# Patient Record
Sex: FEMALE | Race: OTHER | Employment: PART TIME | URBAN - METROPOLITAN AREA
[De-identification: names, ages, dates, MRNs, and addresses within clinical notes are randomized per-mention and may not be internally consistent; named-entity substitution may affect disease eponyms.]

---

## 2018-05-24 ENCOUNTER — OFFICE VISIT (OUTPATIENT)
Dept: FAMILY MEDICINE CLINIC | Age: 38
End: 2018-05-24

## 2018-05-24 VITALS
SYSTOLIC BLOOD PRESSURE: 113 MMHG | HEIGHT: 60 IN | TEMPERATURE: 98.2 F | BODY MASS INDEX: 27.45 KG/M2 | DIASTOLIC BLOOD PRESSURE: 72 MMHG | WEIGHT: 139.8 LBS | HEART RATE: 76 BPM

## 2018-05-24 DIAGNOSIS — K11.5 SIALOLITHIASIS OF SUBMANDIBULAR GLAND: Primary | ICD-10-CM

## 2018-05-24 RX ORDER — CEPHALEXIN 500 MG/1
500 CAPSULE ORAL 4 TIMES DAILY
Qty: 40 CAP | Refills: 0 | Status: SHIPPED | OUTPATIENT
Start: 2018-05-24 | End: 2018-06-03

## 2018-05-24 NOTE — PROGRESS NOTES
The pt left the clinic but was called to come back to the clinic for her AVS and coupon for her medication. Told pt that rx's have been sent to pharmacy and they should be ready for  in approximately 2 hrs.reviewed th emedication ordered today with the pt. Pt told to please present GoodRx. com coupon which we provided to your pharmacy to receive discounted craig. Joseph Sifuentes was the .  Sudhakar Garcia RN

## 2018-05-24 NOTE — PROGRESS NOTES
Subjective:     Chief Complaint   Patient presents with    Mouth Pain     x 3 days, denies fever        She  is a 41 y.o. female who presents for evaluation of mouth pain. Onset approx Tue as mild and yesterday pain worsened. Noted pain is under her tongue. Occasionally bleeding noting during toothbrushing, but none during the day. No assoc nodules nor vesicles nor exudate noted. Last DDS work approx 6 months ago, had a molar removed at [facility]. Noted some chills/fevers last night. No assoc dysphagia nor throat pain. ROS  Gen - no fever/chills  Resp - no dyspnea or cough  CV - no chest pain or ARZATE  Rest per HPI    History reviewed. No pertinent past medical history. History reviewed. No pertinent surgical history. Current Outpatient Prescriptions on File Prior to Visit   Medication Sig Dispense Refill    metroNIDAZOLE (FLAGYL) 500 mg tablet Take one pill twice daily for one week. No ETOH. Yakut please 14 Tab 0     No current facility-administered medications on file prior to visit. Objective:     Vitals:    05/24/18 1124   BP: 113/72   Pulse: 76   Temp: 98.2 °F (36.8 °C)   TempSrc: Oral   Weight: 139 lb 12.8 oz (63.4 kg)   Height: 5' 0.04\" (1.525 m)       Physical Examination:  General appearance - alert, well appearing, and in no distress  Eyes -sclera anicteric  Neck - supple, no significant adenopathy, no thyromegaly  Chest - clear to auscultation, no wheezes, rales or rhonchi, symmetric air entry  Heart - normal rate, regular rhythm, normal S1, S2, no murmurs, rubs, clicks or gallops  Neurological - alert, oriented, no focal findings or movement disorder noted    Noted, mod inflamm of sub-mandibular gland, extreme tenderness, no visible exudate noted  No regional lymphadenopathy     Assessment/ Plan:   Diagnoses and all orders for this visit:    1. Sialolithiasis of submandibular gland  -     cephALEXin (KEFLEX) 500 mg capsule;  Take 1 Cap by mouth four (4) times daily for 10 days. Suspect infect salv gland given Hx/exam.     Start PO Keflex QID x 10 days. Pt has Diclofenac at home, take 2-3x day PRN for pain relief. Discussed emergency S&S r/t breathing/swallowing or extreme swelling that would warrant urgent ED Eval/     Work note given for return Tue 5/29. Re-eval PRN if no improvement in 2-3 weeks. I have discussed the diagnosis with the patient and the intended plan as seen in the above orders. The patient has received an after-visit summary and questions were answered concerning future plans. I have discussed medication side effects and warnings with the patient as well. The patient verbalizes understanding and agreement with the plan.     Follow-up Disposition: Not on File

## 2018-05-24 NOTE — PROGRESS NOTES
Chief Complaint   Patient presents with    Mouth Pain     x 3 days, denies fever     Coordination of Care  1. Have you been to the ER, urgent care clinic since your last visit? Hospitalized since your last visit? No    2. Have you seen or consulted any other health care providers outside of the [address] [place] [address] since your last visit? Include any pap smears or colon screening. No    Does the patient need refills? NO    Learning Assessment Complete?  yes

## 2018-05-24 NOTE — PATIENT INSTRUCTIONS
Cálculo de la glándula salival: Instrucciones de cuidado - [ Salivary Gland Stone: Care Instructions ]  Instrucciones de cuidado    Las glándulas salivales producen saliva. Un cálculo de la glándula salival es miriam acumulación (o concreción) de material leo, generalmente calcio, que puede formarse en cualquiera de las lindy glándulas salivales principales en la boca. Los cálculos de la glándula salival también se llaman cálculos del conducto salival. Los cálculos se roel la mayoría de las veces en la glándula que libera saliva debajo de la lengua. Un cálculo puede impedir que la saliva fluya de la glándula. Cuando la saliva se acumula detrás del cálculo puede hacer que la glándula se hinche. La glándula se hincha cuando usted come, y Jaime la hinchazón disminuye lentamente después de comer. Rosezella Epley y el dolor pueden estar debajo de la mandíbula o en la mona adelante del oído, según la glándula que esté afectada. Degroot médico le preguntará acerca de sean síntomas. Él o alexandra betzaida vez pueda guilherme y sentir la glándula debajo de la piel o en el suelo de la boca. Quizás le jarad pruebas por imágenes, baljit miriam tomografía computarizada (CT, por sean siglas en inglés) o miriam ecografía. [place] ayudará a degroot médico a saber si usted tiene un cálculo en vez de algún otro problema. La mayoría de los cálculos salen en la boca por sí solos. Mientras el cálculo está en la glándula, degroot médico puede hacer que tome analgésicos (medicamentos para el dolor). También hay algunas cosas que puede hacer en el hogar para ayudar a que se desplace el cálculo. Si el cálculo en la glándula no sale al cabo de [employer], degroot médico hablará con usted acerca de las opciones de [place]. La atención de seguimiento es miriam parte clave de degroot tratamiento y seguridad. Asegúrese de hacer y acudir a todas las citas, y llame a degroot médico si está teniendo problemas.  También es miriam buena idea saber los resultados de los exámenes y mantener miriam lista de [place] medicamentos que lucía. ¿Cómo puede cuidarse en el hogar? · Sea renée con los medicamentos. [place] y siga todas las instrucciones de la [place]. ¨ Si el médico le recetó un analgésico, tómelo baljit se lo indicó. ¨ Si no está tomando un analgésico recetado, pregúntele a degroot médico si puede tequila un medicamento de [facility]. · Si degroot médico le recetó antibióticos, tómelos según las indicaciones. No deje de tomarlos solo porque se sienta mejor. Usted debe tequila todos los antibióticos [employer]. · Use goma de mascar o caramelos sin azúcar baljit pastillas de henrry, o chupe un gajo de henrry. Aumentan la producción de saliva, lo cual puede ayudar a empujar el cálculo hacia afuera. · [place] suavemente la glándula afectada para ayudar a que salga el cálculo. ¿Cuándo debe pedir ayuda? Llame a degroot médico ahora mismo o busque atención médica inmediata si:  ? · Tiene síntomas de infección, tales baljit:  ¨ Aumento de dolor, hinchazón, temperatura o enrojecimiento. ¨ Vetas rojizas que salen de la glándula salival.  ¨ Pus que sale de la mona de donde sale la saliva en la boca. [place]. ?Preste especial atención a los cambios en degroot tasia y asegúrese de comunicarse con degroot médico si:  ? · No mejora baljit se esperaba. ¿Dónde puede encontrar más información en inglés? [place] a http://radha-taj.info/. Gaby Shepherd Y417 en la búsqueda para aprender más acerca de \"Cálculo de la glándula salival: Instrucciones de cuidado - [ Salivary Gland Stone: Care Instructions ]. \"  Revisado: 12 craig, 2017  Versión del contenido: 11.4  © 2229-0068 Healthwise, [facility]. Las instrucciones de cuidado fueron adaptadas bajo licencia por Good Help Connections (which disclaims liability or warranty for this information). Si usted tiene [place] afección médica o sobre estas instrucciones, siempre pregunte a degroot profesional de tasia.  Contact At Once!, [facility] niega toda garantía o responsabilidad por degroot uso de esta información.

## 2018-05-25 ENCOUNTER — HOSPITAL ENCOUNTER (EMERGENCY)
Age: 38
Discharge: HOME | End: 2018-05-25
Attending: EMERGENCY MEDICINE
Payer: SELF-PAY

## 2018-05-25 VITALS
HEART RATE: 103 BPM | BODY MASS INDEX: 25.56 KG/M2 | SYSTOLIC BLOOD PRESSURE: 121 MMHG | TEMPERATURE: 99.7 F | WEIGHT: 138.89 LBS | RESPIRATION RATE: 18 BRPM | OXYGEN SATURATION: 100 % | DIASTOLIC BLOOD PRESSURE: 80 MMHG | HEIGHT: 62 IN

## 2018-05-25 DIAGNOSIS — K08.89 PAIN, DENTAL: Primary | ICD-10-CM

## 2018-05-25 PROCEDURE — 99283 EMERGENCY DEPT VISIT LOW MDM: CPT

## 2018-05-25 RX ORDER — CLINDAMYCIN HYDROCHLORIDE 300 MG/1
300 CAPSULE ORAL 4 TIMES DAILY
Qty: 28 CAP | Refills: 0 | Status: SHIPPED | OUTPATIENT
Start: 2018-05-25 | End: 2018-06-01

## 2018-05-25 NOTE — ED NOTES
Pt does not understand questions regarding any allergies to medications despite multiple attempt using  phone.   [employer]  phone used for interpretation # [id number]

## 2018-05-25 NOTE — DISCHARGE INSTRUCTIONS
Dolor de dientes y encías: Instrucciones de cuidado - [ Tooth and Gum Pain: Care Instructions ]  Instrucciones de cuidado    Las causas más comunes de dolor de dientes son la caries dental y [address] El dolor también puede estar causado por miriam infección en un diente (absceso) o en las encías. O usted podría tener dolor causado por un diente fracturado o fisurado. Otras causas de dolor incluyen infección y daño en un diente por rechinar los dientes debido a los nervios. Shar[place] Notice del juicio puede doler cuando está saliendo andreea no puede atravesar la encía. También puede doler cuando la [employer] solo ha salido parcialmente y se ha formado tejido adicional de la encía alrededor de alexandra. El tejido puede inflamarse (pericoronaritis) y a veces se infecta. La atención odontológica inmediata puede ayudar a determinar la causa del dolor y evitar que el diente muera o que la enfermedad de las encías empeore. Los cuidados personales en el [facility] podrían reducir el dolor y el malestar. La atención de seguimiento es miriam parte clave de degroot tratamiento y seguridad. Asegúrese de hacer y acudir a todas las citas, y llame a degroot dentista o a degroot médico si está teniendo problemas. También es miriam buena idea saber los resultados de sean exámenes y mantener miriam lista de los medicamentos que lucía. ¿Cómo puede cuidarse en el [facility]? · Para reducir el dolor y la hinchazón en la chris, aplíquese hielo o miriam compresa fría en la parte externa de la mejilla hong 10 a 20 minutos cada vez. Póngase un paño bal entre el hielo y la piel. No use calor. · Si el médico le recetó antibióticos, tómelos según las indicaciones. No deje de tomarlos por el simple hecho de que se sienta mejor. Necesita tequila los antibióticos [employer]. · Pregúntele a degroot médico si puede tequila un analgésico (medicamento para el dolor) de venta mely, baljit acetaminofén (Tylenol), ibuprofeno (Advil, Motrin) o naproxeno (Aleve).  Sea renée con los medicamentos. Monica y siga todas las instrucciones de la [place]. · Evite los alimentos y las bebidas muy calientes, fríos o dulces si aumentan el dolor. · Enjuáguese la boca con agua salada tibia cada 2 horas para ayudar a aliviar el dolor y la hinchazón. Mezcle 1 cucharadita de sal en 8 onzas (240 mL) de agua. · Hable con degroot dentista acerca de utilizar miriam pasta dental especial para dientes sensibles. Para reducir el dolor ante el contacto con el frío o el calor o al [employer] dientes, cepíllese regularmente con esta pasta de dientes o frótese miriam pequeña cantidad de la pasta de dientes en la mona sensible con un dedo limpio 2 o 3 veces al día. Pásese suavemente el hilo dental [employer]. · No fume ni use tabaco para mascar. El tabaco puede [employer] problemas de las [place], reduce degroot capacidad para combatir infecciones en [place] y retrasa la sanación. Si necesita ayuda para dejar de fumar, hable con degroot médico sobre programas y medicamentos para dejar de fumar. Estos pueden aumentar sean probabilidades de dejar el hábito para siempre. ¿Cuándo debe pedir ayuda? Llame al 911 en cualquier momento que considere que necesita atención de [place]. Por ejemplo, llame si:  ? · Tiene dificultad para respirar. ? Llame a degroot dentista o a degroot médico ahora mismo o busque atención médica inmediata si:  ? · Tiene señales de infección, tales baljit:  ¨ Aumento del dolor, la hinchazón, la temperatura o el enrojecimiento. ¨ Vetas rojizas que salen de la mona. ¨ Pus que sale de la mona. [place]. ?Preste especial atención a los cambios en degroot tasia y asegúrese de comunicarse con degroot médico si:  ? · No mejora baljit se esperaba. ¿Dónde puede encontrar más información en inglés? [place] a http://radha-taj.info/. Escriba H417 en la búsqueda para aprender más acerca de \"Dolor de dientes y encías: Instrucciones de cuidado - [ Tooth and Gum Pain: Care Instructions ]. \"  Revisado: 12 Jose Hawthorne, 2017  Versión del contenido: 11.4  © 8239-0308 Healthwise, Incorporated. Las instrucciones de cuidado fueron adaptadas bajo licencia por Good Help Connections (which disclaims liability or warranty for this information). Si usted tiene [place] afección médica o sobre estas instrucciones, siempre pregunte a degroot profesional de tasia. [employer], [employer] niega toda garantía o responsabilidad por degroot uso de esta información.

## 2018-05-25 NOTE — ED PROVIDER NOTES
HPI Comments: Awilda Bustamante is a 41 y.o. Kazakh speaking female who presents ambulatory to the ED with  c/o right low mouth pain. Per patient by way of [employer] phone, she was seen in the PCP office yesterday and was prescribed Keflex for Sialolithiasis of submandibular gland. Patient has taken a few doses and returns to the emergency room for further evaluation due to increased pain. Patient states she is running low grade fever and has chills, denies any nausea or vomiting. Denies any drainage. Last dentist visit 6 months ago for a molar removal. There are no further complaints at this time. PCP: None    PMHx significant for: History reviewed. No pertinent past medical history. PSHx significant for: History reviewed. No pertinent surgical history. Social Hx: Tobacco: none EtOH: none Illicit drug use: none    There are no further complaints or symptoms at this time. The history is provided by the patient. History reviewed. No pertinent past medical history. History reviewed. No pertinent surgical history. History reviewed. No pertinent family history. Social History     Social History    Marital status: SINGLE     Spouse name: N/A    Number of children: N/A    Years of education: N/A     Occupational History    Not on file. Social History Main Topics    Smoking status: Never Smoker    Smokeless tobacco: Never Used    Alcohol use No    Drug use: No    Sexual activity: Not on file     Other Topics Concern    Not on file     Social History Narrative         ALLERGIES: Review of patient's allergies indicates no known allergies. Review of Systems   Constitutional: Negative for appetite change, chills, diaphoresis, fatigue and fever. HENT: Negative for congestion, ear discharge, ear pain, sinus pain, sinus pressure, sore throat and trouble swallowing. C/o mouth pain     Eyes: Negative for photophobia, pain, redness and visual disturbance. Respiratory: Negative for chest tightness, shortness of breath and wheezing. Cardiovascular: Negative for chest pain and palpitations. Gastrointestinal: Negative for abdominal distention, abdominal pain, nausea and vomiting. Endocrine: Negative. Genitourinary: Negative for difficulty urinating, flank pain, frequency and urgency. Musculoskeletal: Negative for back pain, neck pain and neck stiffness. Skin: Negative for color change, pallor, rash and wound. Allergic/Immunologic: Negative. Neurological: Negative for dizziness, speech difficulty, weakness and headaches. Hematological: Does not bruise/bleed easily. Psychiatric/Behavioral: Negative for behavioral problems. The patient is not nervous/anxious. Vitals:    05/25/18 1327   BP: 133/65   Pulse: (!) 103   Resp: 18   Temp: 99.7 °F (37.6 °C)   SpO2: 100%   Weight: 63 kg (138 lb 14.2 oz)   Height: 5' 2\" (1.575 m)            Physical Exam   Constitutional: She is oriented to person, place, and time. She appears well-developed and well-nourished. No distress. Mouth pain right sublingual pain   HENT:   Head: Normocephalic and atraumatic. Right Ear: External ear normal.   Left Ear: External ear normal.   Nose: Nose normal.   Mouth/Throat: Oropharynx is clear and moist.   Eyes: Conjunctivae and EOM are normal. Pupils are equal, round, and reactive to light. Right eye exhibits no discharge. Left eye exhibits no discharge. Neck: Normal range of motion. Neck supple. No JVD present. No tracheal deviation present. Cardiovascular: Normal rate, regular rhythm, normal heart sounds and intact distal pulses. Exam reveals no gallop. No murmur heard. Pulmonary/Chest: Effort normal and breath sounds normal. No respiratory distress. She has no wheezes. She has no rales. She exhibits no tenderness. Abdominal: Soft. Bowel sounds are normal. She exhibits no distension. There is no tenderness. There is no rebound and no guarding. Genitourinary:   Genitourinary Comments: deferred   Musculoskeletal: Normal range of motion. She exhibits no edema or tenderness. Neurological: She is alert and oriented to person, place, and time. Skin: Skin is warm and dry. No rash noted. No erythema. No pallor. Psychiatric: She has a normal mood and affect. Her behavior is normal. Judgment and thought content normal.   Nursing note and vitals reviewed. MDM  Number of Diagnoses or Management Options  Pain, dental: new and requires workup  Diagnosis management comments: Plan:  Discharge to home and follow up with PCP. Follow up with dentistry        ED Course     2:46 PM  Pt has been reexamined. Pt has no new complaints, changes or physical findings. Care plan outlined and precautions discussed. All available results were reviewed with pt. All medications were reviewed with pt. All of pt's questions and concerns were addressed. Pt agrees to F/U as instructed and agrees to return to ED upon further deterioration. Pt is ready to go home.   Larissa Trevino NP      Procedures

## 2018-05-25 NOTE — ED TRIAGE NOTES
Pt presents with complaint of mouth pain x3 days, pt received cephalexin from clinic. Pt reports difficulty eating and sleeping due to pain.